# Patient Record
Sex: FEMALE | Race: WHITE | NOT HISPANIC OR LATINO | URBAN - METROPOLITAN AREA
[De-identification: names, ages, dates, MRNs, and addresses within clinical notes are randomized per-mention and may not be internally consistent; named-entity substitution may affect disease eponyms.]

---

## 2022-05-16 ENCOUNTER — APPOINTMENT (RX ONLY)
Dept: URBAN - METROPOLITAN AREA CLINIC 332 | Facility: CLINIC | Age: 87
Setting detail: DERMATOLOGY
End: 2022-05-16

## 2022-05-16 PROBLEM — C44.329 SQUAMOUS CELL CARCINOMA OF SKIN OF OTHER PARTS OF FACE: Status: ACTIVE | Noted: 2022-05-16

## 2022-05-16 PROCEDURE — ? ADDITIONAL NOTES

## 2022-05-16 PROCEDURE — ? SURGICAL DECISION MAKING

## 2022-05-16 PROCEDURE — ? MOHS APPROPRIATE USE CRITERIA

## 2022-05-16 PROCEDURE — 99205 OFFICE O/P NEW HI 60 MIN: CPT

## 2022-05-16 NOTE — PROCEDURE: SURGICAL DECISION MAKING
Risk Assessment Explanation (Does Not Render In The Note): Clinical determination of the probability and/or consequences of an event, such as surgery. Clinical assessment of the level of risk is affected by the nature of the event under consideration for the patient. Modifier 57 is used to indicate an Evaluation and Management (E/M) service resulted in the initial decision to perform surgery either the day before a major surgery (90 day global) or the day of a major surgery.
Initial Decision For Surgery?: Yes
Discussion: Risks may include and are not limited to blood loss, infection, reaction to anesthesia, numbness and/or lack of sensation, nerve damage, recurrence of the lesion or the formation of thick or otherwise objectionable scars or keloids, benefits to Mohs Surgery and alternatives to treatment to include (but not limited to) X-ray radiation treatment, ED&C (scrapte and burn procedure), cryosurgery, standard excision, interferon and laser treatments discussed at length with patient today.
Complexity (Necessary For Coding; Major - 90 Day Global With Some Exceptions; Minor - 10 Day Global): major

## 2022-05-16 NOTE — PROCEDURE: ADDITIONAL NOTES
Additional Notes: Patient consent  was obtained to proceed with the visit and recommended plan of care after discussion of all risks and benefits, including the risks of COVID-19 exposure.
Detail Level: Simple
Render Risk Assessment In Note?: no

## 2022-05-16 NOTE — HPI: MOHS SURGERY CONSULTATION
Has The Cancer Been Biopsied Before?: has been previously biopsied
Body Location Override (Optional): Right mid temple
When Was Your Biopsy?: 4-27-22
Accession (Optional): [id number]
Who Is Your Referring Provider?: SILVIA BowlingNP

## 2022-07-13 ENCOUNTER — APPOINTMENT (RX ONLY)
Dept: URBAN - METROPOLITAN AREA CLINIC 332 | Facility: CLINIC | Age: 87
Setting detail: DERMATOLOGY
End: 2022-07-13

## 2022-07-13 VITALS — SYSTOLIC BLOOD PRESSURE: 182 MMHG | DIASTOLIC BLOOD PRESSURE: 88 MMHG | HEART RATE: 74 BPM

## 2022-07-13 PROBLEM — C44.329 SQUAMOUS CELL CARCINOMA OF SKIN OF OTHER PARTS OF FACE: Status: ACTIVE | Noted: 2022-07-13

## 2022-07-13 PROCEDURE — ? MOHS SURGERY

## 2022-07-13 PROCEDURE — ? ADDITIONAL NOTES

## 2022-07-13 PROCEDURE — ? PRESCRIPTION

## 2022-07-13 PROCEDURE — 13132 CMPLX RPR F/C/C/M/N/AX/G/H/F: CPT

## 2022-07-13 PROCEDURE — 17311 MOHS 1 STAGE H/N/HF/G: CPT

## 2022-07-13 RX ORDER — MUPIROCIN 20 MG/G
1 OINTMENT TOPICAL BID
Qty: 22 | Refills: 2 | Status: ERX | COMMUNITY
Start: 2022-07-13

## 2022-07-13 RX ADMIN — MUPIROCIN 1: 20 OINTMENT TOPICAL at 00:00

## 2022-07-13 NOTE — PROCEDURE: MOHS SURGERY
Form reviewed.  Duplicate orders for tylenol.  [facility] Rx canceled.  Please fax back.  Kept tylenol 325-650 mg every 4 hours as needed for pain/fever.   Cheek Interpolation Flap Text: A decision was made to reconstruct the defect utilizing an interpolation axial flap and a staged reconstruction.  A telfa template was made of the defect.  This telfa template was then used to outline the Cheek Interpolation flap.  The donor area for the pedicle flap was then injected with anesthesia.  The flap was excised through the skin and subcutaneous tissue down to the layer of the underlying musculature.  The interpolation flap was carefully excised within this deep plane to maintain its blood supply.  The edges of the donor site were undermined.   The donor site was closed in a primary fashion.  The pedicle was then rotated into position and sutured.  Once the tube was sutured into place, adequate blood supply was confirmed with blanching and refill.  The pedicle was then wrapped with xeroform gauze and dressed appropriately with a telfa and gauze bandage to ensure continued blood supply and protect the attached pedicle.

## 2022-07-20 ENCOUNTER — APPOINTMENT (RX ONLY)
Dept: URBAN - METROPOLITAN AREA CLINIC 332 | Facility: CLINIC | Age: 87
Setting detail: DERMATOLOGY
End: 2022-07-20

## 2022-07-20 DIAGNOSIS — Z48.02 ENCOUNTER FOR REMOVAL OF SUTURES: ICD-10-CM

## 2022-07-20 PROCEDURE — ? ADDITIONAL NOTES

## 2022-07-20 PROCEDURE — 99024 POSTOP FOLLOW-UP VISIT: CPT

## 2022-07-20 PROCEDURE — ? SUTURE REMOVAL (GLOBAL PERIOD)

## 2022-07-20 ASSESSMENT — LOCATION DETAILED DESCRIPTION DERM: LOCATION DETAILED: RIGHT INFERIOR LATERAL FOREHEAD

## 2022-07-20 ASSESSMENT — LOCATION ZONE DERM: LOCATION ZONE: FACE

## 2022-07-20 ASSESSMENT — LOCATION SIMPLE DESCRIPTION DERM: LOCATION SIMPLE: RIGHT FOREHEAD

## 2022-07-20 NOTE — PROCEDURE: SUTURE REMOVAL (GLOBAL PERIOD)
Detail Level: Detailed
Add 65686 Cpt? (Important Note: In 2017 The Use Of 92941 Is Being Tracked By Cms To Determine Future Global Period Reimbursement For Global Periods): yes